# Patient Record
Sex: MALE | URBAN - METROPOLITAN AREA
[De-identification: names, ages, dates, MRNs, and addresses within clinical notes are randomized per-mention and may not be internally consistent; named-entity substitution may affect disease eponyms.]

---

## 2017-11-25 ENCOUNTER — APPOINTMENT (OUTPATIENT)
Dept: GENERAL RADIOLOGY | Facility: CLINIC | Age: 57
End: 2017-11-25
Attending: EMERGENCY MEDICINE
Payer: COMMERCIAL

## 2017-11-25 ENCOUNTER — HOSPITAL ENCOUNTER (EMERGENCY)
Facility: CLINIC | Age: 57
Discharge: HOME | End: 2017-11-25
Attending: EMERGENCY MEDICINE | Admitting: EMERGENCY MEDICINE
Payer: COMMERCIAL

## 2017-11-25 VITALS
RESPIRATION RATE: 16 BRPM | SYSTOLIC BLOOD PRESSURE: 153 MMHG | OXYGEN SATURATION: 97 % | WEIGHT: 160 LBS | DIASTOLIC BLOOD PRESSURE: 99 MMHG | TEMPERATURE: 98.7 F

## 2017-11-25 DIAGNOSIS — M79.672 LEFT FOOT PAIN: ICD-10-CM

## 2017-11-25 PROCEDURE — 25000128 H RX IP 250 OP 636: Performed by: EMERGENCY MEDICINE

## 2017-11-25 PROCEDURE — 96372 THER/PROPH/DIAG INJ SC/IM: CPT

## 2017-11-25 PROCEDURE — 73630 X-RAY EXAM OF FOOT: CPT | Mod: LT

## 2017-11-25 PROCEDURE — 99284 EMERGENCY DEPT VISIT MOD MDM: CPT | Mod: 25

## 2017-11-25 RX ORDER — CEPHALEXIN 500 MG/1
500 CAPSULE ORAL 4 TIMES DAILY
Qty: 20 CAPSULE | Refills: 0 | Status: SHIPPED | OUTPATIENT
Start: 2017-11-25 | End: 2017-11-30

## 2017-11-25 RX ORDER — KETOROLAC TROMETHAMINE 15 MG/ML
15 INJECTION, SOLUTION INTRAMUSCULAR; INTRAVENOUS ONCE
Status: COMPLETED | OUTPATIENT
Start: 2017-11-25 | End: 2017-11-25

## 2017-11-25 RX ORDER — NAPROXEN 500 MG/1
500 TABLET ORAL 2 TIMES DAILY WITH MEALS
Qty: 14 TABLET | Refills: 0 | Status: SHIPPED | OUTPATIENT
Start: 2017-11-25 | End: 2017-12-02

## 2017-11-25 RX ADMIN — KETOROLAC TROMETHAMINE 15 MG: 15 INJECTION, SOLUTION INTRAMUSCULAR; INTRAVENOUS at 13:29

## 2017-11-25 ASSESSMENT — ENCOUNTER SYMPTOMS
FEVER: 0
CONSTITUTIONAL NEGATIVE: 1

## 2017-11-25 NOTE — DISCHARGE INSTRUCTIONS
Take naproxen 500 mg twice per day for pain. If take this medication, don't take the ibuprofen as these are similar medications  Use tylenol 500 mg every 4-6 hours for pain (max dose of 3000 mg per day)    Ice the area and elevate the foot    If redness starts to spread up your leg, would start the antibiotics (keflex) and complete the course and follow up with primary care provider

## 2017-11-25 NOTE — ED AVS SNAPSHOT
Emergency Department    [address] [facility] [address]    Phone:  [phone]    Fax:  [phone]                                       Melvin Do   MRN: [id number]    Department:   Emergency Department   Date of Visit:  11/25/2017           After Visit Summary Signature Page     I have received my discharge instructions, and my questions have been answered. I have discussed any challenges I see with this plan with the nurse or doctor.    ..........................................................................................................................................  Patient/Patient Representative Signature      ..........................................................................................................................................  Patient Representative Print Name and Relationship to Patient    ..................................................               ................................................  Date                                            Time    ..........................................................................................................................................  Reviewed by Signature/Title    ...................................................              ..............................................  Date                                                            Time

## 2017-11-25 NOTE — ED PROVIDER NOTES
History     Chief Complaint:  Left Foot Pain    HPI   Melvin Do is a 57 year old male who presents with concerns for left foot pain. The patient returned from [facility] recently and reports excessive walking when there. Tried new shoes while there and thinks that irritated his foot.The patient has been treating his symptoms with 2 ibuprofen daily which has been ineffective. This morning he took 3 ibuprofen and decided to come to the ED for his symptoms. Here in the ED, the patient has mild pain and his foot is swollen. Has some mild erythema but this has not changed in several days. He denies trauma, history of gout, history of blood clots or leg swelling. He denies known medical problems. The patient attributes his pain to the shoes he was wearing on his vacation. Denies fever.    Allergies:  No Known Drug Allergies    Medications:    The patient is not currently taking any prescribed medications.    Past Medical History:    The patient denies any relevant past medical history.    Past Surgical History:    History reviewed. No pertinent past surgical history.     Family History:    The patient denies any relevant family medical history.    Social History:  The patient was accompanied to the ED by his wife.  Smoking Status: never  Smokeless Tobacco: never  Alcohol Use: no    Marital Status:      Review of Systems   Constitutional: Negative.  Negative for fever.   Cardiovascular: Negative for leg swelling.   Musculoskeletal:        Left foot pain and swelling    All other systems reviewed and are negative.    Physical Exam   First Vitals:  BP: (!) 153/99  Heart Rate: 102  Temp: 98.7  F (37.1  C)  Resp: 16  Weight: 72.6 kg (160 lb)  SpO2: 97 %    Physical Exam  General: Resting comfortably on the gurney  Eyes:  The pupils are equal and round  ENT:    Moist mucous membranes  Neck:  Normal range of motion  CV:  Regular rate and rhythm    Skin warm and well perfused     DP/PT pulses 2+  bilaterally  Resp:  Non labored breathing  MS:  Tenderness to palpation to left first MTP joint. Mild swelling near this area.     No swelling on left ankle/calf    No warmth on foot    Moving left ankle and left toes in full ROM  Skin:  Minimal erythema on dorsum of left foot lateral to MTP joint.   Neuro:   Awake, alert.      Speech is normal and fluent.    Face is symmetric.     Moves all extremities equally    SILT on feet  Psych: Normal affect.  Appropriate interactions.    Emergency Department Course   Imaging:  Radiographic findings were communicated with the patient who voiced understanding of the findings.  XR Foot, G/E 3 views, left   Negative. As per radiology.     Emergency Department Course:  Nursing notes and vitals reviewed. I performed an exam of the patient as documented above.     The patient was sent for an X-ray of the left foot while in the emergency department, findings above.     1318 I rechecked the patient and discussed the results of his workup thus far.     Findings and plan explained to the Patient. Patient discharged home with instructions regarding supportive care, medications, and reasons to return. The importance of close follow-up was reviewed. The patient was prescribed Naprosyn.     I personally reviewed the imaging results with the Patient and answered all related questions prior to discharge.   Impression & Plan    Medical Decision Makin y/o male who presents to the ED with foot pain. He has tenderness to palpation over his 1st MTP joint. Neurovascularly intact. There is mild swelling near this area. Minimal erythema lateral to the joint, no erythema over joint. He has full ROM and doubt septic joint. He his neurovascularly intact. There is no swelling at his ankle or higher up to suggest DVT. He does mention that he was wearing new shoes that were irritating in this area and was walking quite a bit. This could be the cause of the pain though he is also tender in a location  that would be consistent with gout. I discussed that this could be a possibility as well. Pain is controlled here in the ED. I recommended naproxen for pain. There is minimal erythema and doubt cellulitis. No evidence of abscess. He wanted to have a prescription for antibiotic just in case the redness is increasing. I discussed that he should only take this if the redness is increasing and then he can start taking them but would recommend follow up with PCP either way. Reasons to return to the ED were discussed with patient.     Diagnosis:    ICD-10-CM    1. Left foot pain M79.672        Disposition:  discharged to home    Discharge Medications:  New Prescriptions    NAPROXEN (NAPROSYN) 500 MG TABLET    Take 1 tablet (500 mg) by mouth 2 times daily (with meals) for 7 days     IBrendan, am serving as a scribe on 11/25/2017 at 11:11 AM to personally document services performed by Vera Teixeira MD based on my observations and the provider's statements to me.       Brendan Kelley  11/25/2017    EMERGENCY DEPARTMENT       Vera Teixeira MD  11/25/17 3140

## 2017-11-25 NOTE — ED AVS SNAPSHOT
Emergency Department    [address] [facility] [address]    Phone:  [phone]    Fax:  [phone]                                       Melvin Do   MRN: [id number]    Department:   Emergency Department   Date of Visit:  11/25/2017           Patient Information     Date Of Birth          1960        Your diagnoses for this visit were:     Left foot pain        You were seen by Vera Teixeira MD.      Follow-up Information     Schedule an appointment as soon as possible for a visit with Merly Gomez.    Specialty:  Family Practice    Contact information:    [facility]  [address]  [facility] [address] [phone]          Follow up with  Emergency Department.    Specialty:  EMERGENCY MEDICINE    Why:  If symptoms worsen    Contact information:    [address] [facility] [id number] [phone]        Discharge Instructions       Take naproxen 500 mg twice per day for pain. If take this medication, don't take the ibuprofen as these are similar medications  Use tylenol 500 mg every 4-6 hours for pain (max dose of 3000 mg per day)    Ice the area and elevate the foot    If redness starts to spread up your leg, would start the antibiotics (keflex) and complete the course and follow up with primary care provider    24 Hour Appointment Hotline       To make an appointment at any [facility], call [phone] ([phone]). If you don't have a family doctor or clinic, we will help you find one. [facility] clinics are conveniently located to serve the needs of you and your family.             Review of your medicines      START taking        Dose / Directions Last dose taken    cephALEXin 500 MG capsule   Commonly known as:  KEFLEX   Dose:  500 mg   Quantity:  20 capsule        Take 1 capsule (500 mg) by mouth 4 times daily for 5 days   Refills:  0        naproxen 500 MG tablet   Commonly known as:  NAPROSYN   Dose:  500 mg    Quantity:  14 tablet        Take 1 tablet (500 mg) by mouth 2 times daily (with meals) for 7 days   Refills:  0                Prescriptions were sent or printed at these locations (2 Prescriptions)                   Other Prescriptions                Printed at Department/Unit printer (2 of 2)         naproxen (NAPROSYN) 500 MG tablet               cephALEXin (KEFLEX) 500 MG capsule                Procedures and tests performed during your visit     Foot XR, G/E 3 views, left      Orders Needing Specimen Collection     None      Pending Results     No orders found from 11/23/2017 to 11/26/2017.            Pending Culture Results     No orders found from 11/23/2017 to 11/26/2017.            Pending Results Instructions     If you had any lab results that were not finalized at the time of your Discharge, you can call the ED Lab Result RN at [phone]. You will be contacted by this team for any positive Lab results or changes in treatment. The nurses are available 7 days a week from 10A to 6:30P.  You can leave a message 24 hours per day and they will return your call.        Test Results From Your Hospital Stay        11/25/2017 12:27 PM      Narrative     LEFT FOOT THREE OR MORE VIEWS   11/25/2017 12:10 PM     HISTORY: Left foot pain.     COMPARISON: None.    FINDINGS: Negative left foot. No fracture. Tiny plantar calcaneal spur  incidentally noted.        Impression     IMPRESSION: Negative.    MING TUBBS MD                Clinical Quality Measure: Blood Pressure Screening     Your blood pressure was checked while you were in the emergency department today. The last reading we obtained was  BP: (!) 153/99 . Please read the guidelines below about what these numbers mean and what you should do about them.  If your systolic blood pressure (the top number) is less than 120 and your diastolic blood pressure (the bottom number) is less than 80, then your blood pressure is normal. There is nothing more that you  "need to do about it.  If your systolic blood pressure (the top number) is 120-139 or your diastolic blood pressure (the bottom number) is 80-89, your blood pressure may be higher than it should be. You should have your blood pressure rechecked within a year by a primary care provider.  If your systolic blood pressure (the top number) is 140 or greater or your diastolic blood pressure (the bottom number) is 90 or greater, you may have high blood pressure. High blood pressure is treatable, but if left untreated over time it can put you at risk for heart attack, stroke, or kidney failure. You should have your blood pressure rechecked by a primary care provider within the next 4 weeks.  If your provider in the emergency department today gave you specific instructions to follow-up with your doctor or provider even sooner than that, you should follow that instruction and not wait for up to 4 weeks for your follow-up visit.        Thank you for choosing [facility]       Thank you for choosing [facility] for your care. Our goal is always to provide you with excellent care. Hearing back from our patients is one way we can continue to improve our services. Please take a few minutes to complete the written survey that you may receive in the mail after you visit with us. Thank you!        Consumer Agent Portal (CAP)[employer] Information     vocaltap lets you send messages to your doctor, view your test results, renew your prescriptions, schedule appointments and more. To sign up, go to [email or web address] . Click on \"Log in\" on the left side of the screen, which will take you to the Welcome page. Then click on \"Sign up Now\" on the right side of the page.     You will be asked to enter the access code listed below, as well as some personal information. Please follow the directions to create your username and password.     Your access code is: [email or web address]  Expires: 2018  1:28 PM     Your access code will  in 90 days. If you need help or a new " code, please call your [facility] clinic or [phone].        Care EveryWhere ID     This is your Care EveryWhere ID. This could be used by other organizations to access your [facility] medical records  [id number]        Equal Access to Services     ANNMARIE CLARK : Tamie Moore, wajesikada luqadaha, qaybta kaalmada bill, michael osei. So [facility] [phone].    ATENCIÓN: Si habla español, tiene a yost disposición servicios gratuitos de asistencia lingüística. Llame al [phone].    We comply with applicable federal civil rights laws and [place] laws. We do not discriminate on the basis of race, color, national origin, age, disability, sex, sexual orientation, or gender identity.            After Visit Summary       This is your record. Keep this with you and show to your community pharmacist(s) and doctor(s) at your next visit.